# Patient Record
Sex: MALE | Race: WHITE
[De-identification: names, ages, dates, MRNs, and addresses within clinical notes are randomized per-mention and may not be internally consistent; named-entity substitution may affect disease eponyms.]

---

## 2018-03-10 ENCOUNTER — HOSPITAL ENCOUNTER (EMERGENCY)
Dept: HOSPITAL 19 - COL.ER | Age: 23
End: 2018-03-10
Payer: COMMERCIAL

## 2018-03-10 VITALS — SYSTOLIC BLOOD PRESSURE: 120 MMHG | HEART RATE: 91 BPM | DIASTOLIC BLOOD PRESSURE: 90 MMHG

## 2018-03-10 VITALS — TEMPERATURE: 96.8 F

## 2018-03-10 VITALS — WEIGHT: 190.48 LBS | HEIGHT: 67.99 IN | BODY MASS INDEX: 28.87 KG/M2
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
## 2018-03-10 DIAGNOSIS — F10.129: Primary | ICD-10-CM

## 2018-03-10 LAB
ALBUMIN SERPL-MCNC: 4.5 GM/DL (ref 3.5–5)
ALP SERPL-CCNC: 66 U/L (ref 50–136)
ALT SERPL-CCNC: 23 U/L (ref 21–72)
ANION GAP SERPL CALC-SCNC: 16 MMOL/L (ref 7–16)
AST SERPL-CCNC: 18 U/L (ref 15–37)
BASOPHILS # BLD: 0.1 10*3/UL (ref 0–0.2)
BASOPHILS NFR BLD AUTO: 0.6 % (ref 0–2)
BILIRUB SERPL-MCNC: 1 MG/DL (ref 0–1)
BUN SERPL-MCNC: 9 MG/DL (ref 9–20)
CALCIUM SERPL-MCNC: 8.2 MG/DL (ref 8.4–10.2)
CHLORIDE SERPL-SCNC: 108 MMOL/L (ref 98–107)
CO2 SERPL-SCNC: 26 MMOL/L (ref 22–30)
CREAT SERPL-SCNC: 0.97 MG/DL (ref 0.66–1.25)
EOSINOPHIL # BLD: 0.1 10*3/UL (ref 0–0.7)
EOSINOPHIL NFR BLD: 1.3 % (ref 0–4)
ERYTHROCYTE [DISTWIDTH] IN BLOOD BY AUTOMATED COUNT: 12.5 % (ref 11.5–14.5)
ETHANOL SPEC-SCNC: 298 MG/DL
GLUCOSE SERPL-MCNC: 130 MG/DL (ref 74–106)
GRANULOCYTES # BLD AUTO: 51.8 % (ref 42.2–75.2)
HCT VFR BLD AUTO: 45.3 % (ref 42–52)
HGB BLD-MCNC: 15.4 G/DL (ref 13.5–18)
LYMPHOCYTES # BLD: 3.5 10*3/UL (ref 1.2–3.4)
LYMPHOCYTES NFR BLD: 40 % (ref 20–51)
MCH RBC QN AUTO: 30 PG (ref 27–31)
MCHC RBC AUTO-ENTMCNC: 34 G/DL (ref 33–37)
MCV RBC AUTO: 88 FL (ref 80–100)
MONOCYTES # BLD: 0.5 10*3/UL (ref 0.1–0.6)
MONOCYTES NFR BLD AUTO: 5.8 % (ref 1.7–9.3)
NEUTROPHILS # BLD: 4.5 10*3/UL (ref 1.4–6.5)
PLATELET # BLD AUTO: 241 K/MM3 (ref 130–400)
PMV BLD AUTO: 9.9 FL (ref 7.4–10.4)
POTASSIUM SERPL-SCNC: 3.5 MMOL/L (ref 3.4–5)
PROT SERPL-MCNC: 7.4 GM/DL (ref 6.4–8.2)
RBC # BLD AUTO: 5.17 M/MM3 (ref 4.2–5.6)
SODIUM SERPL-SCNC: 149 MMOL/L (ref 137–145)

## 2018-07-05 ENCOUNTER — HOSPITAL ENCOUNTER (OUTPATIENT)
Dept: HOSPITAL 61 - KCIC MRI | Age: 23
Discharge: HOME | End: 2018-07-05
Payer: COMMERCIAL

## 2018-07-05 DIAGNOSIS — M51.36: Primary | ICD-10-CM

## 2018-07-05 PROCEDURE — 72146 MRI CHEST SPINE W/O DYE: CPT

## 2018-07-05 PROCEDURE — 72148 MRI LUMBAR SPINE W/O DYE: CPT

## 2019-02-25 ENCOUNTER — HOSPITAL ENCOUNTER (OUTPATIENT)
Dept: HOSPITAL 63 - CT | Age: 24
Discharge: HOME | End: 2019-02-25
Attending: FAMILY MEDICINE
Payer: COMMERCIAL

## 2019-02-25 DIAGNOSIS — R07.89: Primary | ICD-10-CM

## 2019-02-25 PROCEDURE — 71250 CT THORAX DX C-: CPT

## 2019-02-25 NOTE — RAD
PQRS Compliance statement:

 

One or more of the following individualized dose reduction techniques were

utilized for this examination:

1. Automated exposure control.

2. Adjustment of the mA and/or kV according to patient size.

3. Use of iterative reconstruction technique.

 

Indication:LEFT SIDE CHEST  AND RIB PAIN FROM FALL ONE WEEK AGO.PT 

SHIELDED

 

TECHNIQUE: CT chest without IV contrast with multiplanar reformats.

 

COMPARISON:None

 

FINDINGS:

Heart is normal in size. No pericardial or pleural effusion. No enlarged 

axillary, mediastinal lymph nodes. Evaluation of hilar lymphadenopathy is 

limited due to lack of IV contrast. Central airways are patent. No 

pneumothorax or focal consolidation in the lungs. Visualized noncontrast 

sections through the liver, spleen, gallbladder, pancreas, adrenals and 

kidneys within normal limits. No acute fractures.

 

IMPRESSION: No acute findings.

 

Electronically signed by: Tato Rodney DO (2/25/2019 6:18 PM) [facility]

## 2020-09-05 ENCOUNTER — HOSPITAL ENCOUNTER (EMERGENCY)
Dept: HOSPITAL 63 - ER | Age: 25
LOS: 1 days | Discharge: HOME | End: 2020-09-06
Payer: COMMERCIAL

## 2020-09-05 VITALS — WEIGHT: 209 LBS | HEIGHT: 68 IN | BODY MASS INDEX: 31.67 KG/M2

## 2020-09-05 DIAGNOSIS — R07.2: Primary | ICD-10-CM

## 2020-09-05 DIAGNOSIS — J45.909: ICD-10-CM

## 2020-09-05 DIAGNOSIS — R20.2: ICD-10-CM

## 2020-09-05 PROCEDURE — 71045 X-RAY EXAM CHEST 1 VIEW: CPT

## 2020-09-05 PROCEDURE — 84484 ASSAY OF TROPONIN QUANT: CPT

## 2020-09-05 PROCEDURE — 93005 ELECTROCARDIOGRAM TRACING: CPT

## 2020-09-05 PROCEDURE — 85025 COMPLETE CBC W/AUTO DIFF WBC: CPT

## 2020-09-05 PROCEDURE — 83690 ASSAY OF LIPASE: CPT

## 2020-09-05 PROCEDURE — 99285 EMERGENCY DEPT VISIT HI MDM: CPT

## 2020-09-05 PROCEDURE — 80053 COMPREHEN METABOLIC PANEL: CPT

## 2020-09-05 PROCEDURE — 36415 COLL VENOUS BLD VENIPUNCTURE: CPT

## 2020-09-05 PROCEDURE — 85007 BL SMEAR W/DIFF WBC COUNT: CPT

## 2020-09-06 VITALS — SYSTOLIC BLOOD PRESSURE: 142 MMHG | DIASTOLIC BLOOD PRESSURE: 79 MMHG

## 2020-09-06 LAB
% LYMPHS: 34 % (ref 24–48)
% MONOS: 13 % (ref 0–10)
% SEGS: 50 % (ref 35–66)
ALBUMIN SERPL-MCNC: 3.7 G/DL (ref 3.4–5)
ALBUMIN/GLOB SERPL: 1 {RATIO} (ref 1–1.7)
ALP SERPL-CCNC: 77 U/L (ref 46–116)
ALT SERPL-CCNC: 18 U/L (ref 16–63)
ANION GAP SERPL CALC-SCNC: 8 MMOL/L (ref 6–14)
AST SERPL-CCNC: 12 U/L (ref 15–37)
BASOPHILS # BLD AUTO: 0.1 X10^3/UL (ref 0–0.2)
BASOPHILS NFR BLD: 1 % (ref 0–3)
BILIRUB SERPL-MCNC: 2 MG/DL (ref 0.2–1)
BUN/CREAT SERPL: 10 (ref 6–20)
CA-I SERPL ISE-MCNC: 11 MG/DL (ref 8–26)
CALCIUM SERPL-MCNC: 9.2 MG/DL (ref 8.5–10.1)
CHLORIDE SERPL-SCNC: 103 MMOL/L (ref 98–107)
CO2 SERPL-SCNC: 26 MMOL/L (ref 21–32)
CREAT SERPL-MCNC: 1.1 MG/DL (ref 0.7–1.3)
EOSINOPHIL NFR BLD AUTO: 3 % (ref 0–5)
EOSINOPHIL NFR BLD: 0.2 X10^3/UL (ref 0–0.7)
EOSINOPHIL NFR BLD: 3 % (ref 0–3)
ERYTHROCYTE [DISTWIDTH] IN BLOOD BY AUTOMATED COUNT: 13.6 % (ref 11.5–14.5)
GFR SERPLBLD BASED ON 1.73 SQ M-ARVRAT: 83 ML/MIN
GLOBULIN SER-MCNC: 3.7 G/DL (ref 2.2–3.8)
GLUCOSE SERPL-MCNC: 105 MG/DL (ref 70–99)
HCT VFR BLD CALC: 43.5 % (ref 39–53)
HGB BLD-MCNC: 14.9 G/DL (ref 13–17.5)
LIPASE: 47 U/L (ref 73–393)
LYMPHOCYTES # BLD: 2 X10^3/UL (ref 1–4.8)
LYMPHOCYTES NFR BLD AUTO: 33 % (ref 24–48)
MCH RBC QN AUTO: 31 PG (ref 25–35)
MCHC RBC AUTO-ENTMCNC: 34 G/DL (ref 31–37)
MCV RBC AUTO: 89 FL (ref 79–100)
MONO #: 1.1 X10^3/UL (ref 0–1.1)
MONOCYTES NFR BLD: 19 % (ref 0–9)
NEUT #: 2.6 X10^3UL (ref 1.8–7.7)
NEUTROPHILS NFR BLD AUTO: 44 % (ref 31–73)
PLATELET # BLD AUTO: 182 X10^3/UL (ref 140–400)
PLATELET # BLD EST: ADEQUATE 10*3/UL
POTASSIUM SERPL-SCNC: 3.5 MMOL/L (ref 3.5–5.1)
PROT SERPL-MCNC: 7.4 G/DL (ref 6.4–8.2)
RBC # BLD AUTO: 4.87 X10^6/UL (ref 4.3–5.7)
SODIUM SERPL-SCNC: 137 MMOL/L (ref 136–145)
WBC # BLD AUTO: 5.9 X10^3/UL (ref 4–11)

## 2020-09-06 NOTE — RAD
CHEST AP ONLY

 

Clinical Indication: Chest pain

 

Comparison:  None.

 

Findings: 

The cardiomediastinal silhouette is normal. Lungs are clear. There is no 

pneumothorax. No pleural effusion is appreciated. No acute bone 

abnormality.

 

IMPRESSION: 

No acute cardiopulmonary process.

 

 

Electronically signed by: Luis F Lopez MD (9/6/2020 2:29 AM) [facility]

## 2020-09-06 NOTE — PHYS DOC
Past History


Past Medical History:  Asthma, Other


Past Surgical History:  No Surgical History


Smoking:  Non-smoker


Alcohol Use:  None


Drug Use:  None





Adult General


Chief Complaint


Chief Complaint:  CHEST PAIN





HPI


HPI





Patient is a 24-year-old male who presents for chest pain.  Onset was less than 

1 hour ago and wake him from sleep.  Nothing known makes better, deep breaths 

and physical exertion make worse.  Pain described as substernal pressure, no 

actual pain, with radiation to left upper extremity.  Timing of symptoms has 

been constant since onset.  Associated symptoms include feeling anxious, left 

upper extremity paresthesias, and nausea.  Patient denies any fever, URI-like 

symptoms, wheezes or productive cough, abdominal pain, changes in bladder or 

bowel function, no COVID-19 contacts, no recent travel, no hemoptysis, no drug 

use.  Patient has history of ER visits for chest pain.  He was last seen at our 

facility in 2014 for this.  He reports that same year being seen and admitted at

[facility], he reports having Holter monitor on discharge that was ultimately 

unremarkable.  He is never had a cardiac echo, catheterization, or other 

advanced studies.  Patient reports his mother has CAD, no immediate family 

member with heart attack less than 55 years old, but admits heart problems run 

deep in both mother and father's lineage





Review of Systems


Review of Systems


Fourteen body systems of review of systems have been reviewed. See HPI for 

pertinent positives and negative responses, other wise all other systems are 

negative, non-pertinent or non-contributory





Allergies


Allergies





Allergies








Coded Allergies Type Severity Reaction Last Updated Verified


 


  No Known Drug Allergies    1/17/15 No











Physical Exam


Physical Exam


Constitutional: Well developed, well nourished, no acute distress, non-toxic 

appearance. 


HENT: Normocephalic, atraumatic, bilateral external ears normal, oropharynx 

moist, no oral exudates, nose normal. 


Eyes: PERRLA, EOMI, conjunctiva normal, no discharge.  


Neck: Normal range of motion, no tenderness, supple, no stridor.  


Cardiovascular: Heart rate regular, sinus rhythm, no murmurs rubs or gallops.  

Nontender chest wall


Lungs & Thorax:  Bilateral breath sounds clear to auscultation 


Abdomen: Bowel sounds normal, soft, no tenderness, no masses, no pulsatile 

masses.  Nonsurgical abdomen, no peritoneal signs


Skin: Warm, dry, no erythema, no rash.  


Back: No tenderness, no CVA tenderness.  


Extremities: No tenderness, no cyanosis, no clubbing, ROM intact, no edema.  


Neurologic: Alert and oriented X 3, grossly normal motor & sensory function, no 

focal deficits noted. 


Psychologic: Affect normal, judgement normal, anxious mood





Current Patient Data


Vital Signs





Vital Signs








  Date Time  Temp Pulse Resp B/P (MAP) Pulse Ox O2 Delivery O2 Flow Rate FiO2


 


9/6/20 00:13 98.1 64 18 146/78 (100) 98 Room Air  








Lab Results





Laboratory Tests








Test


 9/6/20


00:30


 


White Blood Count


 5.9 x10^3/uL


(4.0-11.0)


 


Red Blood Count


 4.87 x10^6/uL


(4.30-5.70)


 


Hemoglobin


 14.9 g/dL


(13.0-17.5)


 


Hematocrit


 43.5 %


(39.0-53.0)


 


Mean Corpuscular Volume 89 fL () 


 


Mean Corpuscular Hemoglobin 31 pg (25-35) 


 


Mean Corpuscular Hemoglobin


Concent 34 g/dL


(31-37)


 


Red Cell Distribution Width


 13.6 %


(11.5-14.5)


 


Platelet Count


 182 x10^3/uL


(140-400)


 


Neutrophils (%) (Auto) 44 % (31-73) 


 


Lymphocytes (%) (Auto) 33 % (24-48) 


 


Monocytes (%) (Auto) 19 % (0-9) 


 


Eosinophils (%) (Auto) 3 % (0-3) 


 


Basophils (%) (Auto) 1 % (0-3) 


 


Neutrophils # (Auto)


 2.6 x10^3uL


(1.8-7.7)


 


Lymphocytes # (Auto)


 2.0 x10^3/uL


(1.0-4.8)


 


Monocytes # (Auto)


 1.1 x10^3/uL


(0.0-1.1)


 


Eosinophils # (Auto)


 0.2 x10^3/uL


(0.0-0.7)


 


Basophils # (Auto)


 0.1 x10^3/uL


(0.0-0.2)


 


Segmented Neutrophils % 50 % (35-66) 


 


Lymphocytes % 34 % (24-48) 


 


Monocytes % 13 % (0-10) 


 


Eosinophils % 3 % (0-5) 


 


Platelet Estimate


 Adequate


(ADEQUATE)


 


Sodium Level


 137 mmol/L


(136-145)


 


Potassium Level


 3.5 mmol/L


(3.5-5.1)


 


Chloride Level


 103 mmol/L


()


 


Carbon Dioxide Level


 26 mmol/L


(21-32)


 


Anion Gap 8 (6-14) 


 


Blood Urea Nitrogen


 11 mg/dL


(8-26)


 


Creatinine


 1.1 mg/dL


(0.7-1.3)


 


Estimated GFR


(Cockcroft-Gault) 83.0 





 


BUN/Creatinine Ratio 10 (6-20) 


 


Glucose Level


 105 mg/dL


(70-99)


 


Calcium Level


 9.2 mg/dL


(8.5-10.1)


 


Total Bilirubin


 2.0 mg/dL


(0.2-1.0)


 


Aspartate Amino Transf


(AST/SGOT) 12 U/L (15-37) 





 


Alanine Aminotransferase


(ALT/SGPT) 18 U/L (16-63) 





 


Alkaline Phosphatase


 77 U/L


()


 


Troponin I Quantitative


 < 0.017 ng/mL


(0-0.055)


 


Total Protein


 7.4 g/dL


(6.4-8.2)


 


Albumin


 3.7 g/dL


(3.4-5.0)


 


Albumin/Globulin Ratio 1.0 (1.0-1.7) 


 


Lipase


 47 U/L


()











EKG


EKG


EKG ordered and interpreted by myself at 0006 hours as sinus rhythm at 61 bpm, 

unremarkable intervals, no axis deviation, no acute ischemic findings, no STEMI.

 This EKG was compared to prior study performed 2/9/2014 and unchanged





Radiology/Procedures


Radiology/Procedures


1 view chest radiograph obtained and interpreted by myself as no acute 

cardiopulmonary process





Course & Med Decision Making


Course & Med Decision Making


Well-appearing ambulatory patient seen on immediate ER arrival


ABCs non-concerning


Comprehensive history and physical exam obtained, subsequent diagnostic studies 

ordered


ER work-up reviewed, grossly non-concerning for any surgical or other emergent 

processes


Discussed most likely diagnosis of noncardiac in origin chest pain, no further 

indication for work-up in ER at this time


I discussed need to follow-up with PCP in upcoming 3 to 10 days time for 

continued outpatient work-up.  I feel patient would benefit from further cardiac

testing such as echocardiogram given family history of heart disease


Strict return precautions discussed with good understanding by patient, all 

questions and concerns addressed prior to ER departure in stable condition





Dragon Disclaimer


Dragon Disclaimer


This electronic medical record was generated, in whole or in part, using a voice

recognition dictation system.





The HEART Score for CP Pts


HEART Score for Chest Pain:  








HEART Score for Chest Pain Response (Comments) Value


 


History Slighlty/Non-Suspicious 0


 


ECG Normal 0


 


Age < 45 0


 


Risk Factors                            1 or 2 Risk Factors 1


 


Troponin < Normal Limit 0


 


Total  1








Risk Factors:


Risk Factors:  DM, Current or recent (<one month) smoker, HTN, HLP, family 

history of CAD, obesity.


Risk Scores:


Score 0 - 3:  2.5% MACE over next 6 weeks - Discharge Home


Score 4 - 6:  20.3% MACE over next 6 weeks - Admit for Clinical Observation


Score 7 - 10:  72.7% MACE over next 6 weeks - Early Invasive Strategies





Departure


Departure:


Impression:  


   Primary Impression:  


   Chest pain


Disposition:  01 HOME/RESIDENCE PRIOR TO ADM


Condition:  STABLE


Referrals:  


LUCIE MARS MD (PCP)


Patient Instructions:  Chest Pain (Nonspecific)





Justification of Admission:


Justification of Admission:


Justification of Admission Dx:  N/A











MIKEY AKINS DO                  Sep 6, 2020 00:09

## 2020-09-06 NOTE — EKG
[facility] ED

                    [facility] [place] [id number]

Test Date:    2020               Test Time:    00:03:42

Pat Name:     WENDY QUEEN           Department:   

Patient ID:   [id number]           Room:          

Gender:       M                        Technician:   

:          [id number]               Requested By: MIKEY AKINS

Order Number: [id number]            Reading MD:     

                                 Measurements

Intervals                              Axis          

Rate:         61                       P:            45

WA:           178                      QRS:          90

QRSD:         98                       T:            34

QT:           376                                    

QTc:          384                                    

                           Interpretive Statements

SINUS RHYTHM

NORMAL ECG

RI6.02

No previous ECG available for comparison